# Patient Record
Sex: MALE | Race: BLACK OR AFRICAN AMERICAN | NOT HISPANIC OR LATINO | URBAN - METROPOLITAN AREA
[De-identification: names, ages, dates, MRNs, and addresses within clinical notes are randomized per-mention and may not be internally consistent; named-entity substitution may affect disease eponyms.]

---

## 2023-06-01 ENCOUNTER — LAB OUTSIDE AN ENCOUNTER (OUTPATIENT)
Dept: URBAN - METROPOLITAN AREA CLINIC 82 | Facility: CLINIC | Age: 73
End: 2023-06-01

## 2023-06-01 ENCOUNTER — OFFICE VISIT (OUTPATIENT)
Dept: URBAN - METROPOLITAN AREA CLINIC 82 | Facility: CLINIC | Age: 73
End: 2023-06-01
Payer: COMMERCIAL

## 2023-06-01 VITALS
HEART RATE: 69 BPM | DIASTOLIC BLOOD PRESSURE: 72 MMHG | TEMPERATURE: 97.2 F | WEIGHT: 226 LBS | HEIGHT: 66 IN | BODY MASS INDEX: 36.32 KG/M2 | SYSTOLIC BLOOD PRESSURE: 113 MMHG

## 2023-06-01 DIAGNOSIS — Z94.0 KIDNEY TRANSPLANTED: ICD-10-CM

## 2023-06-01 DIAGNOSIS — K57.90 DIVERTICULOSIS: ICD-10-CM

## 2023-06-01 DIAGNOSIS — K21.9 CHRONIC GERD: ICD-10-CM

## 2023-06-01 DIAGNOSIS — Z86.010 PERSONAL HISTORY OF COLONIC POLYPS: ICD-10-CM

## 2023-06-01 PROBLEM — 397881000: Status: ACTIVE | Noted: 2023-06-01

## 2023-06-01 PROBLEM — 161665007: Status: ACTIVE | Noted: 2023-06-01

## 2023-06-01 PROBLEM — 428283002: Status: ACTIVE | Noted: 2023-06-01

## 2023-06-01 PROBLEM — 235595009: Status: ACTIVE | Noted: 2023-06-01

## 2023-06-01 PROCEDURE — 99203 OFFICE O/P NEW LOW 30 MIN: CPT | Performed by: INTERNAL MEDICINE

## 2023-06-01 RX ORDER — SODIUM, POTASSIUM,MAG SULFATES 17.5-3.13G
354 ML SOLUTION, RECONSTITUTED, ORAL ORAL
Qty: 1 KIT | Refills: 0 | OUTPATIENT
Start: 2023-06-01 | End: 2023-06-02

## 2023-06-01 RX ORDER — PREDNISONE 10 MG/1
1 TABLET TABLET ORAL ONCE A DAY
Status: ACTIVE | COMMUNITY
Start: 2023-06-01

## 2023-06-01 RX ORDER — TACROLIMUS 5 MG/1
AS DIRECTED CAPSULE ORAL
Status: ACTIVE | COMMUNITY
Start: 2023-06-01

## 2023-06-01 NOTE — HPI-TODAY'S VISIT:
72-year-old male came in here for surveillance colonoscopy evaluation for history of colon polyps.  Since he was last seen about 3 years ago he had renal transplantation done.  Patient denies any abdominal pain nausea vomiting.  Denies any cardiac symptoms his current medications were reviewed.  He reports having intermittent acid reflux not on any medications he reports having slight epigastric discomfort when he eats food and he is on chronic prednisone.  Denies any melanotic stools.  He denies any prior history of peptic ulcer disease.

## 2023-06-04 ENCOUNTER — TELEPHONE ENCOUNTER (OUTPATIENT)
Dept: URBAN - METROPOLITAN AREA CLINIC 82 | Facility: CLINIC | Age: 73
End: 2023-06-04

## 2023-06-04 RX ORDER — POLYETHYLENE GLYCOL 3350, SODIUM SULFATE ANHYDROUS, SODIUM BICARBONATE, SODIUM CHLORIDE, POTASSIUM CHLORIDE 236; 22.74; 6.74; 5.86; 2.97 G/4L; G/4L; G/4L; G/4L; G/4L
AS DIRECTED POWDER, FOR SOLUTION ORAL ONCE
Qty: 1 GALLON | Refills: 0 | OUTPATIENT
Start: 2023-06-04 | End: 2023-06-05

## 2023-06-05 ENCOUNTER — TELEPHONE ENCOUNTER (OUTPATIENT)
Dept: URBAN - NONMETROPOLITAN AREA CLINIC 2 | Facility: CLINIC | Age: 73
End: 2023-06-05

## 2023-08-15 ENCOUNTER — OFFICE VISIT (OUTPATIENT)
Dept: URBAN - METROPOLITAN AREA SURGERY CENTER 13 | Facility: SURGERY CENTER | Age: 73
End: 2023-08-15

## 2023-08-15 ENCOUNTER — CLAIMS CREATED FROM THE CLAIM WINDOW (OUTPATIENT)
Dept: URBAN - METROPOLITAN AREA CLINIC 4 | Facility: CLINIC | Age: 73
End: 2023-08-15
Payer: COMMERCIAL

## 2023-08-15 ENCOUNTER — CLAIMS CREATED FROM THE CLAIM WINDOW (OUTPATIENT)
Dept: URBAN - METROPOLITAN AREA SURGERY CENTER 13 | Facility: SURGERY CENTER | Age: 73
End: 2023-08-15
Payer: COMMERCIAL

## 2023-08-15 DIAGNOSIS — D12.2 ADENOMA OF ASCENDING COLON: ICD-10-CM

## 2023-08-15 DIAGNOSIS — Z86.010 ADENOMAS PERSONAL HISTORY OF COLONIC POLYPS: ICD-10-CM

## 2023-08-15 DIAGNOSIS — Q43.8 CONGENITAL REDUNDANT COLON: ICD-10-CM

## 2023-08-15 DIAGNOSIS — D12.2 BENIGN NEOPLASM OF ASCENDING COLON: ICD-10-CM

## 2023-08-15 PROCEDURE — G8907 PT DOC NO EVENTS ON DISCHARG: HCPCS | Performed by: INTERNAL MEDICINE

## 2023-08-15 PROCEDURE — 88305 TISSUE EXAM BY PATHOLOGIST: CPT | Performed by: PATHOLOGY

## 2023-08-15 PROCEDURE — 45380 COLONOSCOPY AND BIOPSY: CPT | Performed by: INTERNAL MEDICINE

## 2023-08-21 ENCOUNTER — TELEPHONE ENCOUNTER (OUTPATIENT)
Dept: URBAN - METROPOLITAN AREA CLINIC 82 | Facility: CLINIC | Age: 73
End: 2023-08-21

## 2023-08-24 ENCOUNTER — DASHBOARD ENCOUNTERS (OUTPATIENT)
Age: 73
End: 2023-08-24

## 2023-08-24 ENCOUNTER — OFFICE VISIT (OUTPATIENT)
Dept: URBAN - METROPOLITAN AREA CLINIC 82 | Facility: CLINIC | Age: 73
End: 2023-08-24
Payer: COMMERCIAL

## 2023-08-24 VITALS
BODY MASS INDEX: 36.48 KG/M2 | WEIGHT: 227 LBS | HEART RATE: 66 BPM | TEMPERATURE: 96.8 F | HEIGHT: 66 IN | SYSTOLIC BLOOD PRESSURE: 116 MMHG | DIASTOLIC BLOOD PRESSURE: 76 MMHG

## 2023-08-24 DIAGNOSIS — K57.90 DIVERTICULOSIS: ICD-10-CM

## 2023-08-24 DIAGNOSIS — Z94.0 KIDNEY TRANSPLANTED: ICD-10-CM

## 2023-08-24 DIAGNOSIS — R19.5 DARK STOOLS: ICD-10-CM

## 2023-08-24 DIAGNOSIS — Z86.010 PERSONAL HISTORY OF COLONIC POLYPS: ICD-10-CM

## 2023-08-24 DIAGNOSIS — K21.9 CHRONIC GERD: ICD-10-CM

## 2023-08-24 PROCEDURE — 99213 OFFICE O/P EST LOW 20 MIN: CPT | Performed by: INTERNAL MEDICINE

## 2023-08-24 RX ORDER — PREDNISONE 10 MG/1
1 TABLET TABLET ORAL ONCE A DAY
Status: ACTIVE | COMMUNITY
Start: 2023-06-01

## 2023-08-24 RX ORDER — TACROLIMUS 5 MG/1
AS DIRECTED CAPSULE ORAL
Status: ACTIVE | COMMUNITY
Start: 2023-06-01

## 2023-08-24 NOTE — PHYSICAL EXAM GASTROINTESTINAL
Abdomen , soft, nontender, nondistended , no guarding or rigidity , no masses palpable , normal bowel sounds , Liver and Spleen , no hepatomegaly present , no hepatosplenomegaly , liver nontender , spleen not palpable , Rectal , normal sphincter tone , external and  internal hemorrhoids, No rectal masses or bleeding present

## 2023-08-24 NOTE — HPI-TODAY'S VISIT:
72-year-old male came in here for surveillance colonoscopy evaluation for history of colon polyps.  Since he was last seen about 3 years ago he had renal transplantation done.  Patient denies any abdominal pain nausea vomiting.  Denies any cardiac symptoms his current medications were reviewed.  He reports having intermittent acid reflux not on any medications he reports having slight epigastric discomfort when he eats food and he is on chronic prednisone.  Denies any melanotic stools.  He denies any prior history of peptic ulcer disease.  8/24/23 : Patient was seen today for complaints of having dark stools that started the day after he had a colonoscopy.  He underwent colonoscopy with removal of 1 small polyp in cecum which is consistent with adenoma.  Polypectomy was performed using cold forceps.  Patient reports having dark stools but however he denies melanotic stools.  He is physically active and has done weight lifting class yesterday without any shortness of breath and exertional dyspnea. Colonoscopy findings and pathology findings were reviewed with the patient.